# Patient Record
Sex: MALE | Race: BLACK OR AFRICAN AMERICAN | Employment: FULL TIME | URBAN - METROPOLITAN AREA
[De-identification: names, ages, dates, MRNs, and addresses within clinical notes are randomized per-mention and may not be internally consistent; named-entity substitution may affect disease eponyms.]

---

## 2021-09-14 ENCOUNTER — HOSPITAL ENCOUNTER (EMERGENCY)
Age: 36
Discharge: HOME | End: 2021-09-14
Payer: COMMERCIAL

## 2021-09-14 VITALS
HEIGHT: 72 IN | HEART RATE: 66 BPM | DIASTOLIC BLOOD PRESSURE: 92 MMHG | WEIGHT: 285 LBS | OXYGEN SATURATION: 99 % | SYSTOLIC BLOOD PRESSURE: 151 MMHG | TEMPERATURE: 98.5 F | RESPIRATION RATE: 18 BRPM | BODY MASS INDEX: 38.6 KG/M2

## 2021-09-14 DIAGNOSIS — H92.01 ACUTE PAIN OF RIGHT EAR: Primary | ICD-10-CM

## 2021-09-14 DIAGNOSIS — H66.90 ACUTE OTITIS MEDIA, UNSPECIFIED OTITIS MEDIA TYPE: ICD-10-CM

## 2021-09-14 DIAGNOSIS — R09.81 NASAL CONGESTION: ICD-10-CM

## 2021-09-14 DIAGNOSIS — H73.20 TYMPANIC MEMBRANE INFLAMMATION: ICD-10-CM

## 2021-09-14 PROCEDURE — 99282 EMERGENCY DEPT VISIT SF MDM: CPT

## 2021-09-14 RX ORDER — MINERAL OIL
180 ENEMA (ML) RECTAL DAILY
Qty: 30 TABLET | Refills: 0 | Status: SHIPPED | OUTPATIENT
Start: 2021-09-14

## 2021-09-14 RX ORDER — FLUTICASONE PROPIONATE 50 MCG
2 SPRAY, SUSPENSION (ML) NASAL DAILY
Qty: 16 G | Refills: 0 | Status: SHIPPED | OUTPATIENT
Start: 2021-09-14

## 2021-09-14 RX ORDER — AMOXICILLIN AND CLAVULANATE POTASSIUM 875; 125 MG/1; MG/1
1 TABLET, FILM COATED ORAL 2 TIMES DAILY
Qty: 10 TABLET | Refills: 0 | Status: SHIPPED | OUTPATIENT
Start: 2021-09-14 | End: 2021-09-19

## 2021-09-14 NOTE — ED PROVIDER NOTES
EMERGENCY DEPARTMENT HISTORY AND PHYSICAL EXAM      Date: 9/14/2021  Patient Name: Buzz Baires    History of Presenting Illness     Chief Complaint   Patient presents with    Ear Pain       History Provided By: Patient    HPI: Buzz Baires, 39 y.o. male with a past medical history significant No significant past medical history presents to the ED with cc of Right ear pain and fullness. He states that the symptoms began 2 to 3 days ago and accompanying symptoms of nasal congestion, postnasal drip, rhinorrhea. He denies fever, chills, body aches, recent illness, known sick contacts. There are no exacerbating or relieving factors. He has not tried any over-the-counter medications. There are no other complaints, changes, or physical findings at this time. PCP: None    No current facility-administered medications on file prior to encounter. No current outpatient medications on file prior to encounter. Past History     Past Medical History:  History reviewed. No pertinent past medical history. Past Surgical History:  History reviewed. No pertinent surgical history. Family History:  History reviewed. No pertinent family history. Social History:  Social History     Tobacco Use    Smoking status: Never Smoker    Smokeless tobacco: Never Used   Vaping Use    Vaping Use: Never used   Substance Use Topics    Alcohol use: Never    Drug use: Never       Allergies:  No Known Allergies      Review of Systems     Review of Systems   Constitutional: Negative for chills and fever. HENT: Positive for congestion, ear pain, postnasal drip and rhinorrhea. Negative for hearing loss, sinus pressure, sinus pain, sneezing, sore throat, tinnitus, trouble swallowing and voice change. Eyes: Negative. Respiratory: Negative for cough, chest tightness and shortness of breath. Cardiovascular: Negative for chest pain and palpitations.    Gastrointestinal: Negative for abdominal pain, blood in stool, constipation, diarrhea, nausea and vomiting. Endocrine: Negative. Genitourinary: Negative for dysuria, frequency and urgency. Musculoskeletal: Negative for back pain and myalgias. Skin: Negative for rash. Allergic/Immunologic: Negative. Neurological: Negative for dizziness, syncope, weakness and headaches. Hematological: Does not bruise/bleed easily. Psychiatric/Behavioral: Negative for confusion, sleep disturbance and suicidal ideas. The patient is not nervous/anxious. All other systems reviewed and are negative. Physical Exam     Physical Exam  Vitals and nursing note reviewed. Constitutional:       General: He is not in acute distress. Appearance: He is well-developed. He is not ill-appearing. HENT:      Head: Normocephalic and atraumatic. Jaw: There is normal jaw occlusion. Right Ear: Hearing normal. No decreased hearing noted. Swelling and tenderness present. No middle ear effusion. There is no impacted cerumen. No foreign body. No mastoid tenderness. No PE tube. No hemotympanum. Tympanic membrane is erythematous. Tympanic membrane is not scarred, perforated or bulging. Tympanic membrane has normal mobility. Left Ear: Hearing, tympanic membrane, ear canal and external ear normal.      Nose: Rhinorrhea present. No congestion. Mouth/Throat:      Pharynx: Oropharynx is clear. Eyes:      Extraocular Movements: Extraocular movements intact. Conjunctiva/sclera: Conjunctivae normal.      Pupils: Pupils are equal, round, and reactive to light. Neck:      Vascular: No JVD. Trachea: No tracheal deviation. Cardiovascular:      Rate and Rhythm: Normal rate and regular rhythm. No extrasystoles are present. Pulses:           Radial pulses are 2+ on the right side and 2+ on the left side. Heart sounds: Normal heart sounds. No murmur heard. Pulmonary:      Effort: Pulmonary effort is normal.      Breath sounds: Normal breath sounds. Chest:      Chest wall: No tenderness. Abdominal:      General: Bowel sounds are normal.      Palpations: Abdomen is soft. Tenderness: There is no abdominal tenderness. There is no guarding or rebound. Musculoskeletal:         General: Normal range of motion. Cervical back: Normal range of motion and neck supple. Right lower leg: No tenderness. No edema. Left lower leg: No tenderness. No edema. Skin:     General: Skin is warm and dry. Capillary Refill: Capillary refill takes less than 2 seconds. Findings: No erythema. Neurological:      General: No focal deficit present. Mental Status: He is alert and oriented to person, place, and time. Motor: No weakness. Psychiatric:         Mood and Affect: Mood normal.         Behavior: Behavior normal.         Lab and Diagnostic Study Results     Labs -   No results found for this or any previous visit (from the past 12 hour(s)). Radiologic Studies -   @lastxrresult@  CT Results  (Last 48 hours)    None        CXR Results  (Last 48 hours)    None            Medical Decision Making   - I am the first provider for this patient. - I reviewed the vital signs, available nursing notes, past medical history, past surgical history, family history and social history. - Initial assessment performed. The patients presenting problems have been discussed, and they are in agreement with the care plan formulated and outlined with them. I have encouraged them to ask questions as they arise throughout their visit. Vital Signs-Reviewed the patient's vital signs. No data found. Records Reviewed: Nursing Notes    The patient presents with ear pain with a differential diagnosis of otitis media, otitis externa, sinus infection, upper respiratory infection, impacted cerumen, postnasal drip, perforated eardrum      ED Course:   Patient was seen and evaluated and reviewed.   Discussed with attending physician who agreed with plan of care.  Patient remained stable with no change or deterioration of symptoms. Provider Notes (Medical Decision Making):     MDM  Number of Diagnoses or Management Options  Acute otitis media, unspecified otitis media type: new, no workup  Acute pain of right ear: new, no workup  Nasal congestion: new, no workup  Tympanic membrane inflammation: new, no workup     Amount and/or Complexity of Data Reviewed  Discuss the patient with other providers: yes    Risk of Complications, Morbidity, and/or Mortality  Presenting problems: minimal  Diagnostic procedures: minimal  Management options: minimal    Patient Progress  Patient progress: stable           Disposition   Disposition: Condition stable  DC- Adult Discharges: All of the diagnostic tests were reviewed and questions answered. Diagnosis, care plan and treatment options were discussed. The patient understands the instructions and will follow up as directed. The patients results have been reviewed with them. They have been counseled regarding their diagnosis. The patient verbally convey understanding and agreement of the signs, symptoms, diagnosis, treatment and prognosis and additionally agrees to follow up as recommended with their PCP in 24 - 48 hours. They also agree with the care-plan and convey that all of their questions have been answered. I have also put together some discharge instructions for them that include: 1) educational information regarding their diagnosis, 2) how to care for their diagnosis at home, as well a 3) list of reasons why they would want to return to the ED prior to their follow-up appointment, should their condition change. Discharged    DISCHARGE PLAN:  1. There are no discharge medications for this patient.     2.   Follow-up Information     Follow up With Specialties Details Why [address] [place] [address] [id number]  In 3 days PCP Referral [address] [place] [address]  [phone]    Follow up with primary care, urgent care, or this Emergency department   If symptoms worsen, As needed         3. Return to ED if worse   4. Discharge Medication List as of 9/14/2021  2:14 PM      START taking these medications    Details   amoxicillin-clavulanate (Augmentin) 875-125 mg per tablet Take 1 Tablet by mouth two (2) times a day for 5 days. , Normal, Disp-10 Tablet, R-0      fluticasone propionate (FLONASE) 50 mcg/actuation nasal spray 2 Sprays by Both Nostrils route daily. Indications: inflammation of the nose due to an allergy, Normal, Disp-16 g, R-0      fexofenadine (Allegra Allergy) 180 mg tablet Take 1 Tablet by mouth daily. Indications: inflammation of the nose due to an allergy, seasonal runny nose, Normal, Disp-30 Tablet, R-0               Diagnosis     Clinical Impression:   1. Acute pain of right ear    2. Tympanic membrane inflammation    3. Nasal congestion    4. Acute otitis media, unspecified otitis media type        Attestations:    Terrie Hoffmann NP    Please note that this dictation was completed with [employer], the [employer] voice recognition software. Quite often unanticipated grammatical, syntax, homophones, and other interpretive errors are inadvertently transcribed by the computer software. Please disregard these errors. Please excuse any errors that have escaped final proofreading. Thank you.

## 2022-12-10 PROBLEM — J30.9 ALLERGIC RHINITIS, UNSPECIFIED SEASONALITY, UNSPECIFIED TRIGGER: Status: ACTIVE | Noted: 2022-12-10

## 2022-12-10 PROBLEM — Z11.59 NEED FOR HEPATITIS C SCREENING TEST: Status: ACTIVE | Noted: 2022-12-10

## 2022-12-15 ENCOUNTER — OFFICE VISIT (OUTPATIENT)
Dept: INTERNAL MEDICINE CLINIC | Age: 37
End: 2022-12-15
Payer: COMMERCIAL

## 2022-12-15 VITALS
OXYGEN SATURATION: 99 % | RESPIRATION RATE: 16 BRPM | HEIGHT: 72 IN | HEART RATE: 80 BPM | TEMPERATURE: 97.8 F | SYSTOLIC BLOOD PRESSURE: 140 MMHG | DIASTOLIC BLOOD PRESSURE: 100 MMHG | WEIGHT: 291 LBS | BODY MASS INDEX: 39.42 KG/M2

## 2022-12-15 DIAGNOSIS — I10 PRIMARY HYPERTENSION: ICD-10-CM

## 2022-12-15 DIAGNOSIS — J30.9 ALLERGIC RHINITIS, UNSPECIFIED SEASONALITY, UNSPECIFIED TRIGGER: ICD-10-CM

## 2022-12-15 DIAGNOSIS — R53.83 FATIGUE, UNSPECIFIED TYPE: ICD-10-CM

## 2022-12-15 DIAGNOSIS — N39.9 URINARY PROBLEM IN MALE: ICD-10-CM

## 2022-12-15 DIAGNOSIS — Z00.00 ANNUAL PHYSICAL EXAM: ICD-10-CM

## 2022-12-15 DIAGNOSIS — E55.9 VITAMIN D DEFICIENCY: ICD-10-CM

## 2022-12-15 DIAGNOSIS — Z11.59 NEED FOR HEPATITIS C SCREENING TEST: ICD-10-CM

## 2022-12-15 DIAGNOSIS — R03.0 ELEVATED BP WITHOUT DIAGNOSIS OF HYPERTENSION: ICD-10-CM

## 2022-12-15 DIAGNOSIS — Z82.49 FAMILY HISTORY OF HYPERTENSION: ICD-10-CM

## 2022-12-15 RX ORDER — AMLODIPINE BESYLATE 5 MG/1
5 TABLET ORAL DAILY
Qty: 30 TABLET | Refills: 2 | Status: SHIPPED | OUTPATIENT
Start: 2022-12-15

## 2022-12-15 NOTE — PROGRESS NOTES
Chase Martel (: 1985) is a 40 y.o. male, new patient, here for evaluation of the following chief complaint(s):  Establish Care (NEW PATIENT)       ASSESSMENT/PLAN:  Below is the assessment and plan developed based on review of pertinent history, physical exam, labs, studies, and medications. 1. Primary hypertension  2. Fatigue, unspecified type  -     CBC WITH AUTOMATED DIFF  -     METABOLIC PANEL, COMPREHENSIVE  -     TSH RFX ON ABNORMAL TO FREE T4  3. Vitamin D deficiency  -     VITAMIN D, 25 HYDROXY  4. Urinary problem in male  -     URINALYSIS W/ RFLX MICROSCOPIC  5. Allergic rhinitis, unspecified seasonality, unspecified trigger  6. Elevated BP without diagnosis of hypertension  -     CBC WITH AUTOMATED DIFF  -     METABOLIC PANEL, COMPREHENSIVE  -     TSH RFX ON ABNORMAL TO FREE T4  7. Need for hepatitis C screening test  -     HEPATITIS C AB  8. Family history of hypertension  9. Annual physical exam  -     CBC WITH AUTOMATED DIFF  -     METABOLIC PANEL, COMPREHENSIVE    Mr. Remigio Solano is here to establish with me but he says he wants to do complete physical examination and he has multiple concerns so I will consider him as a new patient and I will not do the diagnostic for for annual physical which has been attached with the labs by technical error. Annual preventive physical examination is done in the office. His blood pressure is elevated. 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 After resting checked again twice in both upper arms manually with large adult size cuff. Discussed about various types of antihypertensive and various groups of medicine and decided to be on amlodipine 5 mg once a day and he will come in 3 weeks for blood pressure and pulse checkup. Labs ordered. He has family history of hypertension with his mother.     He feels fatigue and tired so labs ordered including thyroid function and his BMI is 39.47 explained to start physical activity minimum to begin with 15 minutes a day gradually increasing to 30 minutes a day and if possible 45 minutes a day he is a  so I told him that he can start gradually. He drinks heavily with liquor on weekends especially Sunday with his friends recommended to stop for now having hypertension and fatigue. He says that whenever he is extremely tired then only he snores otherwise there is no snoring or cessation of breathing so right now I will not send him for sleep apnea evaluation. He says he had vitamin D deficiency in the past so labs ordered. He says sometimes he urinates but no erectile dysfunction no burning pain in urination no family or personal history of prostate related complaints no dysuria no straining while urination sometimes he leaks urine. No family history of prostate cancer. He is sexually active with his girlfriend. His girlfriend has history of seizure disorder. Patient has no anxiety depression or mental health problems so urine orders for the lab. Strongly recommended to have 1500-calorie diet plan and diet low in sodium, low in sugar, low in starch, low in saturated fat and to read the food labels and eat more greens and dark greens vegetables and lentils known sugary fruits and fibers in the diet drink more water    Follow-up in 3 months with me in in 3 weeks with physician in our office. Labs ordered. Educational brochures given. Immunizations reviewed discussed with him he says he never takes flu vaccine he does not want to take vaccine for now    Return in about 3 weeks (around 1/5/2023) for follow up for chronic conditionON NEW BP MEDS,LABS ORDERED. SUBJECTIVE/OBJECTIVE:  HPI  Trever Reasons he came for annual preventive physical.  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 No recent history of ER visit. No history of mental health problems or depression. He is pleasant personality. No history of substance abuse. No history of smoking cigarette but he does acknowledges that he drinks more than usual on weekend especially on Sunday with liquors and little bit sharing with his friend. He agreed to cut back and stop. He says he feels tired and fatigued. No blurry vision or no chest pain shortness of breath or headache or dizziness. He is sexually active and he has his girlfriend who has seizure disorder. He says his mother is going to see me and he lives closer to his mother. His mother also has history of hypertension but no history of colon cancer or prostate cancer in the family and no history of heart attack or stroke in the family. He agreed and will try to lose weight. He agreed to do blood work. Sometimes he says he urinates. No erectile dysfunction. No Known Allergies  Current Outpatient Medications   Medication Sig    amLODIPine (NORVASC) 5 mg tablet Take 1 Tablet by mouth daily. fluticasone propionate (FLONASE) 50 mcg/actuation nasal spray 2 Sprays by Both Nostrils route daily. Indications: inflammation of the nose due to an allergy (Patient not taking: Reported on 12/15/2022)    fexofenadine (Allegra Allergy) 180 mg tablet Take 1 Tablet by mouth daily. Indications: inflammation of the nose due to an allergy, seasonal runny nose (Patient not taking: Reported on 12/15/2022)     No current facility-administered medications for this visit. History reviewed. No pertinent past medical history. Past Surgical History:   Procedure Laterality Date    HX ORTHOPAEDIC Left     left shoulder     Family History   Problem Relation Age of Onset    No Known Problems Mother     Hypertension Father      Social History     Tobacco Use   Smoking Status Never   Smokeless Tobacco Never         Review of Systems   Constitutional:  Positive for malaise/fatigue. Negative for chills, diaphoresis, fever and weight loss.    HENT:  Negative for congestion, hearing loss, sore throat and tinnitus. Eyes:  Negative for blurred vision. Respiratory:  Negative for cough, shortness of breath and wheezing. Cardiovascular: Negative. Gastrointestinal: Negative. Genitourinary: Negative. Musculoskeletal: Negative. Skin: Negative. Neurological:  Negative for dizziness, tingling, tremors and headaches. Psychiatric/Behavioral: Negative. Vitals:    12/15/22 1418 12/15/22 1425 12/15/22 1439   BP: (!) 158/94 (!) 154/98 (!) 140/100   Pulse: 78  80   Resp: 16     Temp: 97.8 °F (36.6 °C)     TempSrc: Temporal     SpO2: 99%     Weight: 291 lb (132 kg)     Height: 6' (1.829 m)            Physical Exam  Constitutional:       General: He is not in acute distress. Appearance: He is obese. He is not ill-appearing, toxic-appearing or diaphoretic. HENT:      Mouth/Throat:      Mouth: Mucous membranes are moist.   Eyes:      Pupils: Pupils are equal, round, and reactive to light. Cardiovascular:      Rate and Rhythm: Normal rate and regular rhythm. Heart sounds: No murmur heard. Pulmonary:      Effort: Pulmonary effort is normal.      Breath sounds: Normal breath sounds. No wheezing or rhonchi. Abdominal:      General: Bowel sounds are normal. There is no distension. Palpations: Abdomen is soft. Tenderness: There is no abdominal tenderness. Musculoskeletal:         General: No swelling or deformity. Cervical back: Neck supple. Lymphadenopathy:      Cervical: No cervical adenopathy. Neurological:      General: No focal deficit present. Mental Status: He is alert and oriented to person, place, and time. Cranial Nerves: No cranial nerve deficit. Motor: No weakness.    Psychiatric:         Mood and Affect: Mood normal.         Behavior: Behavior normal.       On this date 12/15/2022 I have spent 45 minutes reviewing previous notes, test results and face to face with the patient discussing the diagnosis and importance of compliance with the treatment plan as well as documenting on the day of the visit. An electronic signature was used to authenticate this note.   -- Bhavana Spicer MD

## 2022-12-15 NOTE — PROGRESS NOTES
Chief Complaint   Patient presents with    [facility]     NEW PATIENT           Visit Vitals  BP (!) 154/98 (BP 1 Location: Right arm, BP Patient Position: Sitting)   Pulse 78   Temp 97.8 °F (36.6 °C) (Temporal)   Resp 16   Ht 6' (1.829 m)   Wt 291 lb (132 kg)   SpO2 99%   BMI 39.47 kg/m²           1. \"Have you been to the ER, urgent care clinic since your last visit? Hospitalized since your last visit? \" No    2. \"Have you seen or consulted any other health care providers outside of the [address] [place] [address] since your last visit? \" No     3. For patients aged 39-70: Has the patient had a colonoscopy / FIT/ Cologuard? NA - based on age      If the patient is female:    4. For patients aged 41-77: Has the patient had a mammogram within the past 2 years? NA - based on age or sex      11. For patients aged 21-65: Has the patient had a pap smear?  NA - based on age or sex

## 2022-12-16 LAB
25(OH)D3+25(OH)D2 SERPL-MCNC: 14.6 NG/ML (ref 30–100)
ALBUMIN SERPL-MCNC: 4.3 G/DL (ref 4–5)
ALBUMIN/GLOB SERPL: 1.5 {RATIO} (ref 1.2–2.2)
ALP SERPL-CCNC: 54 IU/L (ref 44–121)
ALT SERPL-CCNC: 13 IU/L (ref 0–44)
APPEARANCE UR: CLEAR
AST SERPL-CCNC: 13 IU/L (ref 0–40)
BASOPHILS # BLD AUTO: 0 X10E3/UL (ref 0–0.2)
BASOPHILS NFR BLD AUTO: 1 %
BILIRUB SERPL-MCNC: 0.4 MG/DL (ref 0–1.2)
BILIRUB UR QL STRIP: NEGATIVE
BUN SERPL-MCNC: 12 MG/DL (ref 6–20)
BUN/CREAT SERPL: 15 (ref 9–20)
CALCIUM SERPL-MCNC: 9.5 MG/DL (ref 8.7–10.2)
CHLORIDE SERPL-SCNC: 103 MMOL/L (ref 96–106)
CO2 SERPL-SCNC: 26 MMOL/L (ref 20–29)
COLOR UR: YELLOW
CREAT SERPL-MCNC: 0.82 MG/DL (ref 0.76–1.27)
EGFR: 116 ML/MIN/1.73
EOSINOPHIL # BLD AUTO: 0 X10E3/UL (ref 0–0.4)
EOSINOPHIL NFR BLD AUTO: 1 %
ERYTHROCYTE [DISTWIDTH] IN BLOOD BY AUTOMATED COUNT: 14.6 % (ref 11.6–15.4)
GLOBULIN SER CALC-MCNC: 2.8 G/DL (ref 1.5–4.5)
GLUCOSE SERPL-MCNC: 90 MG/DL (ref 70–99)
GLUCOSE UR QL STRIP: NEGATIVE
HCT VFR BLD AUTO: 40.8 % (ref 37.5–51)
HCV AB S/CO SERPL IA: 0.1 S/CO RATIO (ref 0–0.9)
HGB BLD-MCNC: 13 G/DL (ref 13–17.7)
HGB UR QL STRIP: NEGATIVE
IMM GRANULOCYTES # BLD AUTO: 0 X10E3/UL (ref 0–0.1)
IMM GRANULOCYTES NFR BLD AUTO: 0 %
KETONES UR QL STRIP: NEGATIVE
LEUKOCYTE ESTERASE UR QL STRIP: NEGATIVE
LYMPHOCYTES # BLD AUTO: 1.5 X10E3/UL (ref 0.7–3.1)
LYMPHOCYTES NFR BLD AUTO: 36 %
MCH RBC QN AUTO: 25.2 PG (ref 26.6–33)
MCHC RBC AUTO-ENTMCNC: 31.9 G/DL (ref 31.5–35.7)
MCV RBC AUTO: 79 FL (ref 79–97)
MICRO URNS: NORMAL
MONOCYTES # BLD AUTO: 0.3 X10E3/UL (ref 0.1–0.9)
MONOCYTES NFR BLD AUTO: 7 %
NEUTROPHILS # BLD AUTO: 2.3 X10E3/UL (ref 1.4–7)
NEUTROPHILS NFR BLD AUTO: 55 %
NITRITE UR QL STRIP: NEGATIVE
PH UR STRIP: 6.5 [PH] (ref 5–7.5)
PLATELET # BLD AUTO: 308 X10E3/UL (ref 150–450)
POTASSIUM SERPL-SCNC: 4.4 MMOL/L (ref 3.5–5.2)
PROT SERPL-MCNC: 7.1 G/DL (ref 6–8.5)
PROT UR QL STRIP: NEGATIVE
RBC # BLD AUTO: 5.15 X10E6/UL (ref 4.14–5.8)
SODIUM SERPL-SCNC: 142 MMOL/L (ref 134–144)
SP GR UR STRIP: 1.02 (ref 1–1.03)
TSH SERPL DL<=0.005 MIU/L-ACNC: 1.48 UIU/ML (ref 0.45–4.5)
UROBILINOGEN UR STRIP-MCNC: 1 MG/DL (ref 0.2–1)
WBC # BLD AUTO: 4.2 X10E3/UL (ref 3.4–10.8)

## 2022-12-16 RX ORDER — ERGOCALCIFEROL 1.25 MG/1
50000 CAPSULE ORAL
Qty: 12 CAPSULE | Refills: 0 | Status: SHIPPED | OUTPATIENT
Start: 2022-12-16

## 2022-12-16 NOTE — PROGRESS NOTES
Yesterday Mr. Remigio Solano came for as a new patient establishment    Urine is normal and there is nothing abnormality detected in urine and awaiting further lab results for blood work

## 2022-12-16 NOTE — PROGRESS NOTES
Please inform Mr. Fernandez Oneida    Complete blood count including white cell count hemoglobin platelets normal    CMP including fasting blood sugar kidney function electrolytes including potassium and liver enzymes normal    Thyroid function normal    Screening for hepatitis C virus negative    Vitamin D is low I am sending vitamin D once a week for 3 months to the pharmacy and after that we will check again when he comes for office visit.

## 2022-12-19 ENCOUNTER — TELEPHONE (OUTPATIENT)
Dept: INTERNAL MEDICINE CLINIC | Age: 37
End: 2022-12-19

## 2022-12-19 NOTE — TELEPHONE ENCOUNTER
Called and informed pt of lab results. Pt expressed understanding  ----- Message from Stewart Brizuela MD sent at 12/16/2022  4:27 PM EST -----  Please inform Mr. Rishabh Ty    Complete blood count including white cell count hemoglobin platelets normal    CMP including fasting blood sugar kidney function electrolytes including potassium and liver enzymes normal    Thyroid function normal    Screening for hepatitis C virus negative    Vitamin D is low I am sending vitamin D once a week for 3 months to the pharmacy and after that we will check again when he comes for office visit.

## 2023-01-09 PROBLEM — Z11.59 NEED FOR HEPATITIS C SCREENING TEST: Status: RESOLVED | Noted: 2022-12-10 | Resolved: 2023-01-09

## 2023-01-14 PROBLEM — Z00.00 ANNUAL PHYSICAL EXAM: Status: RESOLVED | Noted: 2022-12-15 | Resolved: 2023-01-14

## 2023-08-20 PROBLEM — R03.0 ELEVATED BP WITHOUT DIAGNOSIS OF HYPERTENSION: Status: RESOLVED | Noted: 2022-12-15 | Resolved: 2023-08-20

## 2023-08-22 ENCOUNTER — OFFICE VISIT (OUTPATIENT)
Facility: CLINIC | Age: 38
End: 2023-08-22
Payer: COMMERCIAL

## 2023-08-22 VITALS
DIASTOLIC BLOOD PRESSURE: 100 MMHG | HEART RATE: 60 BPM | TEMPERATURE: 98.2 F | WEIGHT: 304.2 LBS | SYSTOLIC BLOOD PRESSURE: 152 MMHG | HEIGHT: 72 IN | OXYGEN SATURATION: 97 % | BODY MASS INDEX: 41.2 KG/M2

## 2023-08-22 DIAGNOSIS — Z11.4 ENCOUNTER FOR SCREENING FOR HIV: ICD-10-CM

## 2023-08-22 DIAGNOSIS — I10 PRIMARY HYPERTENSION: ICD-10-CM

## 2023-08-22 DIAGNOSIS — E55.9 VITAMIN D DEFICIENCY: ICD-10-CM

## 2023-08-22 DIAGNOSIS — R06.83 SNORING: ICD-10-CM

## 2023-08-22 DIAGNOSIS — I10 PRIMARY HYPERTENSION: Primary | ICD-10-CM

## 2023-08-22 PROCEDURE — 3077F SYST BP >= 140 MM HG: CPT | Performed by: INTERNAL MEDICINE

## 2023-08-22 PROCEDURE — 3080F DIAST BP >= 90 MM HG: CPT | Performed by: INTERNAL MEDICINE

## 2023-08-22 PROCEDURE — 99214 OFFICE O/P EST MOD 30 MIN: CPT | Performed by: INTERNAL MEDICINE

## 2023-08-22 RX ORDER — LOSARTAN POTASSIUM AND HYDROCHLOROTHIAZIDE 12.5; 5 MG/1; MG/1
1 TABLET ORAL DAILY
Qty: 90 TABLET | Refills: 1 | Status: SHIPPED | OUTPATIENT
Start: 2023-08-22

## 2023-08-22 RX ORDER — ERGOCALCIFEROL 1.25 MG/1
50000 CAPSULE ORAL
Qty: 12 CAPSULE | Refills: 0 | Status: SHIPPED | OUTPATIENT
Start: 2023-08-22

## 2023-08-22 SDOH — ECONOMIC STABILITY: HOUSING INSECURITY
IN THE LAST 12 MONTHS, WAS THERE A TIME WHEN YOU DID NOT HAVE A STEADY PLACE TO SLEEP OR SLEPT IN A SHELTER (INCLUDING NOW)?: NO

## 2023-08-22 SDOH — ECONOMIC STABILITY: FOOD INSECURITY: WITHIN THE PAST 12 MONTHS, THE FOOD YOU BOUGHT JUST DIDN'T LAST AND YOU DIDN'T HAVE MONEY TO GET MORE.: NEVER TRUE

## 2023-08-22 SDOH — ECONOMIC STABILITY: INCOME INSECURITY: HOW HARD IS IT FOR YOU TO PAY FOR THE VERY BASICS LIKE FOOD, HOUSING, MEDICAL CARE, AND HEATING?: NOT HARD AT ALL

## 2023-08-22 SDOH — ECONOMIC STABILITY: FOOD INSECURITY: WITHIN THE PAST 12 MONTHS, YOU WORRIED THAT YOUR FOOD WOULD RUN OUT BEFORE YOU GOT MONEY TO BUY MORE.: NEVER TRUE

## 2023-08-22 ASSESSMENT — PATIENT HEALTH QUESTIONNAIRE - PHQ9
SUM OF ALL RESPONSES TO PHQ QUESTIONS 1-9: 0
SUM OF ALL RESPONSES TO PHQ QUESTIONS 1-9: 0
1. LITTLE INTEREST OR PLEASURE IN DOING THINGS: 0
SUM OF ALL RESPONSES TO PHQ9 QUESTIONS 1 & 2: 0
SUM OF ALL RESPONSES TO PHQ QUESTIONS 1-9: 0
2. FEELING DOWN, DEPRESSED OR HOPELESS: 0
SUM OF ALL RESPONSES TO PHQ QUESTIONS 1-9: 0

## 2023-08-22 ASSESSMENT — ENCOUNTER SYMPTOMS
RESPIRATORY NEGATIVE: 1
EYES NEGATIVE: 1
GASTROINTESTINAL NEGATIVE: 1
ALLERGIC/IMMUNOLOGIC NEGATIVE: 1

## 2023-08-23 LAB
25(OH)D3+25(OH)D2 SERPL-MCNC: 14.6 NG/ML (ref 30–100)
ALBUMIN SERPL-MCNC: 4.4 G/DL (ref 4.1–5.1)
ALBUMIN/GLOB SERPL: 1.6 {RATIO} (ref 1.2–2.2)
ALP SERPL-CCNC: 56 IU/L (ref 44–121)
ALT SERPL-CCNC: 23 IU/L (ref 0–44)
AST SERPL-CCNC: 18 IU/L (ref 0–40)
BASOPHILS # BLD AUTO: 0 X10E3/UL (ref 0–0.2)
BASOPHILS NFR BLD AUTO: 1 %
BILIRUB SERPL-MCNC: 0.4 MG/DL (ref 0–1.2)
BUN SERPL-MCNC: 9 MG/DL (ref 6–20)
BUN/CREAT SERPL: 11 (ref 9–20)
CALCIUM SERPL-MCNC: 9.2 MG/DL (ref 8.7–10.2)
CHLORIDE SERPL-SCNC: 102 MMOL/L (ref 96–106)
CO2 SERPL-SCNC: 24 MMOL/L (ref 20–29)
CREAT SERPL-MCNC: 0.84 MG/DL (ref 0.76–1.27)
EGFRCR SERPLBLD CKD-EPI 2021: 114 ML/MIN/1.73
EOSINOPHIL # BLD AUTO: 0.1 X10E3/UL (ref 0–0.4)
EOSINOPHIL NFR BLD AUTO: 1 %
ERYTHROCYTE [DISTWIDTH] IN BLOOD BY AUTOMATED COUNT: 14.9 % (ref 11.6–15.4)
GLOBULIN SER CALC-MCNC: 2.8 G/DL (ref 1.5–4.5)
GLUCOSE SERPL-MCNC: 109 MG/DL (ref 70–99)
HCT VFR BLD AUTO: 40.4 % (ref 37.5–51)
HGB BLD-MCNC: 13 G/DL (ref 13–17.7)
HIV 1+2 AB+HIV1 P24 AG SERPL QL IA: NON REACTIVE
IMM GRANULOCYTES # BLD AUTO: 0 X10E3/UL (ref 0–0.1)
IMM GRANULOCYTES NFR BLD AUTO: 0 %
LYMPHOCYTES # BLD AUTO: 1.7 X10E3/UL (ref 0.7–3.1)
LYMPHOCYTES NFR BLD AUTO: 35 %
MCH RBC QN AUTO: 26.3 PG (ref 26.6–33)
MCHC RBC AUTO-ENTMCNC: 32.2 G/DL (ref 31.5–35.7)
MCV RBC AUTO: 82 FL (ref 79–97)
MONOCYTES # BLD AUTO: 0.3 X10E3/UL (ref 0.1–0.9)
MONOCYTES NFR BLD AUTO: 6 %
NEUTROPHILS # BLD AUTO: 2.8 X10E3/UL (ref 1.4–7)
NEUTROPHILS NFR BLD AUTO: 57 %
PLATELET # BLD AUTO: 271 X10E3/UL (ref 150–450)
POTASSIUM SERPL-SCNC: 4.2 MMOL/L (ref 3.5–5.2)
PROT SERPL-MCNC: 7.2 G/DL (ref 6–8.5)
RBC # BLD AUTO: 4.94 X10E6/UL (ref 4.14–5.8)
SODIUM SERPL-SCNC: 143 MMOL/L (ref 134–144)
TSH SERPL DL<=0.005 MIU/L-ACNC: 1.66 UIU/ML (ref 0.45–4.5)
WBC # BLD AUTO: 5 X10E3/UL (ref 3.4–10.8)

## 2023-08-23 RX ORDER — ERGOCALCIFEROL 1.25 MG/1
50000 CAPSULE ORAL WEEKLY
Qty: 12 CAPSULE | Refills: 1 | Status: SHIPPED | OUTPATIENT
Start: 2023-08-23

## 2023-08-29 ENCOUNTER — TELEPHONE (OUTPATIENT)
Facility: CLINIC | Age: 38
End: 2023-08-29

## 2023-08-29 NOTE — TELEPHONE ENCOUNTER
Patient called stating he spoke to his insurance and they will pay for them. He needs pre-authorizations for [facility]RUBIO BROUSSARD and Saxenda, but the phentermine is fine to send to the [facility] [address].

## 2023-09-26 ENCOUNTER — TELEPHONE (OUTPATIENT)
Facility: CLINIC | Age: 38
End: 2023-09-26

## 2023-09-26 ENCOUNTER — OFFICE VISIT (OUTPATIENT)
Facility: CLINIC | Age: 38
End: 2023-09-26
Payer: COMMERCIAL

## 2023-09-26 VITALS
BODY MASS INDEX: 40.8 KG/M2 | DIASTOLIC BLOOD PRESSURE: 100 MMHG | SYSTOLIC BLOOD PRESSURE: 142 MMHG | HEART RATE: 60 BPM | WEIGHT: 300.8 LBS | TEMPERATURE: 97.9 F | OXYGEN SATURATION: 97 %

## 2023-09-26 DIAGNOSIS — I10 PRIMARY HYPERTENSION: Primary | ICD-10-CM

## 2023-09-26 DIAGNOSIS — R73.9 HYPERGLYCEMIA: ICD-10-CM

## 2023-09-26 DIAGNOSIS — E11.65 TYPE 2 DIABETES MELLITUS WITH HYPERGLYCEMIA, WITHOUT LONG-TERM CURRENT USE OF INSULIN (HCC): ICD-10-CM

## 2023-09-26 DIAGNOSIS — H54.3 IMPAIRED VISION IN BOTH EYES: ICD-10-CM

## 2023-09-26 DIAGNOSIS — Z13.1 SCREENING FOR DIABETES MELLITUS (DM): ICD-10-CM

## 2023-09-26 DIAGNOSIS — E55.9 VITAMIN D DEFICIENCY: ICD-10-CM

## 2023-09-26 LAB
GLUCOSE, POC: 130 MG/DL
HBA1C MFR BLD: 7.2 %

## 2023-09-26 PROCEDURE — 82962 GLUCOSE BLOOD TEST: CPT | Performed by: INTERNAL MEDICINE

## 2023-09-26 PROCEDURE — 3075F SYST BP GE 130 - 139MM HG: CPT | Performed by: INTERNAL MEDICINE

## 2023-09-26 PROCEDURE — 3080F DIAST BP >= 90 MM HG: CPT | Performed by: INTERNAL MEDICINE

## 2023-09-26 PROCEDURE — 83036 HEMOGLOBIN GLYCOSYLATED A1C: CPT | Performed by: INTERNAL MEDICINE

## 2023-09-26 PROCEDURE — 99214 OFFICE O/P EST MOD 30 MIN: CPT | Performed by: INTERNAL MEDICINE

## 2023-09-26 RX ORDER — METFORMIN HYDROCHLORIDE 500 MG/1
TABLET, EXTENDED RELEASE ORAL
Qty: 90 TABLET | Refills: 1 | Status: SHIPPED | OUTPATIENT
Start: 2023-09-26

## 2023-09-26 RX ORDER — BLOOD SUGAR DIAGNOSTIC
STRIP MISCELLANEOUS
Qty: 100 EACH | Refills: 3 | Status: SHIPPED | OUTPATIENT
Start: 2023-09-26

## 2023-09-26 RX ORDER — ERGOCALCIFEROL 1.25 MG/1
50000 CAPSULE ORAL WEEKLY
Qty: 12 CAPSULE | Refills: 1 | Status: SHIPPED | OUTPATIENT
Start: 2023-09-26

## 2023-09-26 RX ORDER — GLUCOSAMINE HCL/CHONDROITIN SU 500-400 MG
CAPSULE ORAL
Qty: 100 STRIP | Refills: 3 | Status: SHIPPED | OUTPATIENT
Start: 2023-09-26

## 2023-09-26 RX ORDER — SEMAGLUTIDE 0.5 MG/.5ML
0.5 INJECTION, SOLUTION SUBCUTANEOUS
Qty: 2 ML | Refills: 0 | Status: SHIPPED | OUTPATIENT
Start: 2023-09-26

## 2023-09-26 RX ORDER — LANCETS 30 GAUGE
EACH MISCELLANEOUS
Qty: 100 EACH | Refills: 3 | Status: SHIPPED | OUTPATIENT
Start: 2023-09-26

## 2023-09-26 RX ORDER — DIPHENHYDRAMINE HYDROCHLORIDE 25 MG/1
CAPSULE, LIQUID FILLED ORAL
Qty: 1 KIT | Refills: 1 | Status: SHIPPED | OUTPATIENT
Start: 2023-09-26

## 2023-09-26 ASSESSMENT — ENCOUNTER SYMPTOMS
EYES NEGATIVE: 1
ALLERGIC/IMMUNOLOGIC NEGATIVE: 1
GASTROINTESTINAL NEGATIVE: 1

## 2023-09-26 NOTE — PROGRESS NOTES
Kevin King (: 1985) is a 45 y.o. male, Established patient patient, here for evaluation of the following chief complaint(s):  Follow-up Chronic Condition and Hypertension         ASSESSMENT/PLAN:  Below is the assessment and plan developed based on review of pertinent history, physical exam, labs, studies, and medications. 1. Primary hypertension  -     External Referral To Optometry  -     EKG 12 Lead; Future  2. Type 2 diabetes mellitus with hyperglycemia, without long-term current use of insulin ([facility])  3. Vitamin D deficiency  4. BMI 40.0-44.9, adult ([facility])  -     EKG 12 Lead; Future  5. Impaired vision in both eyes  -     External Referral To Optometry  6. Screening for diabetes mellitus (DM)  -     AMB POC HEMOGLOBIN A1C  -     AMB POC GLUCOSE BLOOD, BY GLUCOSE MONITORING DEVICE  7. Hyperglycemia  8. Type 2 diabetes mellitus with hyperglycemia ([facility])  -     [facility]    Hypertension uncontrolled because he forgets to take antihypertensive that I have prescribed and he has not taken for last 4 days discussed about serious complications from uncontrolled blood pressure leading to heart disease stroke kidney complication and other complications    EKG ordered. New onset type 2 diabetes mellitus. Fasting blood sugar is 130 in the office hemoglobin A1c is 7.2% in the office. He does not have classical symptoms of polyuria or polydipsia of course he does drink a lot of water he wants to lose weight and he called his insurance company and he says V-Go is covered started on metformin side effect discussed he initially will take metformin 500 mg ER once a day for 4 weeks and then he will start twice a day with lunch and dinner and V-Go we also started and he has no personal or family history of pancreatic cancer or thyroid cancer or medullary endocrine neoplasia type II.   He has been ring of vision so I referred him to ophthalmologist.    I am sending diabetic

## 2023-09-26 NOTE — TELEPHONE ENCOUNTER
Patient called and stated that Julio stated his prescription for [facility]RUBIO BROUSSARD is on back order for 6 months. He would like something else prescribed if possible.

## 2023-10-26 PROBLEM — Z13.1 SCREENING FOR DIABETES MELLITUS (DM): Status: RESOLVED | Noted: 2022-12-15 | Resolved: 2023-10-26

## 2023-10-27 ENCOUNTER — TELEPHONE (OUTPATIENT)
Facility: CLINIC | Age: 38
End: 2023-10-27

## 2023-10-27 RX ORDER — PHENTERMINE HYDROCHLORIDE 37.5 MG/1
37.5 TABLET ORAL
Qty: 30 TABLET | Refills: 1 | Status: SHIPPED | OUTPATIENT
Start: 2023-10-27 | End: 2023-12-26

## 2023-10-27 NOTE — TELEPHONE ENCOUNTER
BP readings today were 137/91, 124/89, and 128/86. Please advise. Per patient you were going to send in alternate weight loss medication.

## 2023-11-30 RX ORDER — SEMAGLUTIDE 0.5 MG/.5ML
0.5 INJECTION, SOLUTION SUBCUTANEOUS
Qty: 2 ML | Refills: 0 | Status: SHIPPED | OUTPATIENT
Start: 2023-11-30

## 2023-11-30 NOTE — TELEPHONE ENCOUNTER
----- Message from Mike Delacruz sent at 11/30/2023  2:27 PM EST -----  Subject: Refill Request    QUESTIONS  Name of Medication? Semaglutide-Weight Management (WEGOVY) 0.5 MG/0.5ML   SOAJ SC injection  Patient-reported dosage and instructions? As directed  How many days do you have left? 0  Preferred Pharmacy? [address] [facility] #[id number]  Pharmacy phone number (if available)? [phone]  ---------------------------------------------------------------------------  --------------  Yoselin Fleming INFO  What is the best way for the office to contact you? OK to leave message on   voicemail  Preferred Call Back Phone Number? [phone]  ---------------------------------------------------------------------------  --------------  SCRIPT ANSWERS  Relationship to Patient?  Self

## 2023-12-14 NOTE — TELEPHONE ENCOUNTER
----- Message from [facility] sent at 12/11/2023  1:54 PM EST -----  Subject: Refill Request    QUESTIONS  Name of Medication? phentermine (ADIPEX-P) 37.5 MG tablet  Patient-reported dosage and instructions? 1 in the am   How many days do you have left? 0  Preferred Pharmacy? [facility] #[id number]  Pharmacy phone number (if available)? [phone]  Additional Information for Provider? Pt can not get the wegovy due to drug   shortage. Please call pt if need be.   ---------------------------------------------------------------------------  --------------  CALL BACK INFO  What is the best way for the office to contact you? OK to leave message on   voicemail  Preferred Call Back Phone Number? [phone]  ---------------------------------------------------------------------------  --------------  SCRIPT ANSWERS  Relationship to Patient?  Self

## 2023-12-15 RX ORDER — PHENTERMINE HYDROCHLORIDE 37.5 MG/1
37.5 TABLET ORAL
Qty: 30 TABLET | Refills: 1 | Status: SHIPPED | OUTPATIENT
Start: 2023-12-15 | End: 2024-02-13

## 2023-12-18 PROBLEM — Z13.220 SCREENING FOR LIPID DISORDERS: Status: ACTIVE | Noted: 2023-12-18

## 2023-12-19 DIAGNOSIS — I10 PRIMARY HYPERTENSION: ICD-10-CM

## 2023-12-19 DIAGNOSIS — Z13.220 SCREENING FOR LIPID DISORDERS: ICD-10-CM

## 2023-12-19 DIAGNOSIS — E55.9 VITAMIN D DEFICIENCY: ICD-10-CM

## 2023-12-19 DIAGNOSIS — E11.65 TYPE 2 DIABETES MELLITUS WITH HYPERGLYCEMIA, WITHOUT LONG-TERM CURRENT USE OF INSULIN (HCC): ICD-10-CM

## 2023-12-21 LAB
25(OH)D3+25(OH)D2 SERPL-MCNC: 32.3 NG/ML (ref 30–100)
ALBUMIN SERPL-MCNC: 4.4 G/DL (ref 4.1–5.1)
ALBUMIN/GLOB SERPL: 1.8 {RATIO} (ref 1.2–2.2)
ALP SERPL-CCNC: 51 IU/L (ref 44–121)
ALT SERPL-CCNC: 11 IU/L (ref 0–44)
AST SERPL-CCNC: 12 IU/L (ref 0–40)
BILIRUB SERPL-MCNC: 0.4 MG/DL (ref 0–1.2)
BUN SERPL-MCNC: 14 MG/DL (ref 6–20)
BUN/CREAT SERPL: 16 (ref 9–20)
CALCIUM SERPL-MCNC: 9.1 MG/DL (ref 8.7–10.2)
CHLORIDE SERPL-SCNC: 102 MMOL/L (ref 96–106)
CHOLEST SERPL-MCNC: 206 MG/DL (ref 100–199)
CO2 SERPL-SCNC: 25 MMOL/L (ref 20–29)
CREAT SERPL-MCNC: 0.89 MG/DL (ref 0.76–1.27)
EGFRCR SERPLBLD CKD-EPI 2021: 112 ML/MIN/1.73
GLOBULIN SER CALC-MCNC: 2.5 G/DL (ref 1.5–4.5)
GLUCOSE SERPL-MCNC: 102 MG/DL (ref 70–99)
HBA1C MFR BLD: 6.4 % (ref 4.8–5.6)
HDLC SERPL-MCNC: 36 MG/DL
LDLC SERPL CALC-MCNC: 155 MG/DL (ref 0–99)
POTASSIUM SERPL-SCNC: 4.5 MMOL/L (ref 3.5–5.2)
PROT SERPL-MCNC: 6.9 G/DL (ref 6–8.5)
SODIUM SERPL-SCNC: 141 MMOL/L (ref 134–144)
TRIGL SERPL-MCNC: 84 MG/DL (ref 0–149)
VLDLC SERPL CALC-MCNC: 15 MG/DL (ref 5–40)

## 2024-01-17 PROBLEM — Z13.220 SCREENING FOR LIPID DISORDERS: Status: RESOLVED | Noted: 2023-12-18 | Resolved: 2024-01-17

## 2024-02-01 ENCOUNTER — TELEPHONE (OUTPATIENT)
Facility: CLINIC | Age: 39
End: 2024-02-01

## 2024-02-01 NOTE — TELEPHONE ENCOUNTER
Patient is requesting a refill on phentermine 37.5 mg Qty 30 with refills to [facility]

## 2024-02-08 NOTE — TELEPHONE ENCOUNTER
Patient is asking for refill on Phentermine. He did not come in for BP check after 30 days.  I called and spoke to him and reminded him he needed nurse visit for BP check. He will come in this week.   I did no pend a refill request to you. I will send it when he comes in.   Is that ok?

## 2024-02-09 RX ORDER — PHENTERMINE HYDROCHLORIDE 37.5 MG/1
37.5 TABLET ORAL
Qty: 30 TABLET | Refills: 0 | Status: SHIPPED | OUTPATIENT
Start: 2024-02-09 | End: 2024-04-09

## 2024-04-07 PROBLEM — R73.9 HYPERGLYCEMIA: Status: RESOLVED | Noted: 2023-09-26 | Resolved: 2024-04-07

## 2024-06-05 ENCOUNTER — TELEPHONE (OUTPATIENT)
Facility: CLINIC | Age: 39
End: 2024-06-05

## 2024-06-05 RX ORDER — LOSARTAN POTASSIUM AND HYDROCHLOROTHIAZIDE 12.5; 5 MG/1; MG/1
1 TABLET ORAL EVERY MORNING
Qty: 90 TABLET | Refills: 0 | Status: SHIPPED | OUTPATIENT
Start: 2024-06-05

## 2024-06-05 RX ORDER — ERGOCALCIFEROL 1.25 MG/1
50000 CAPSULE ORAL WEEKLY
Qty: 12 CAPSULE | Refills: 0 | Status: SHIPPED | OUTPATIENT
Start: 2024-06-05

## 2024-06-05 NOTE — TELEPHONE ENCOUNTER
Patient called requesting refills for the following medications:        vitamin D (ERGOCALCIFEROL) 1.25 MG (40550 UT) CAPS capsule       losartan-hydroCHLOROthiazide (HYZAAR) 50-12.5 MG per tablet         Please send to [facility] on [address] in [place], [place]

## 2024-06-05 NOTE — TELEPHONE ENCOUNTER
----- Message from Becky Frost sent at 5/29/2024  4:10 PM EDT -----  Subject: Appointment Request    Reason for Call: Established Patient Appointment needed: Routine Existing   Condition Follow Up    QUESTIONS    Reason for appointment request? Other - ecc could not schedule      Additional Information for Provider? Patient is requesting to have a call   back to re-schedule his upcoming appointment scheduled on 5/30/2024 for   follow up for medication refill pt. states he is completely out of   medication and would like a appointment on a Friday if possible.  ---------------------------------------------------------------------------  --------------  CALL BACK INFO  [phone]; OK to leave message on voicemail  ---------------------------------------------------------------------------  --------------  SCRIPT ANSWERS   Start Hydralazine 10 mg tonight - take one tablet three times a day

## 2024-07-18 PROBLEM — E78.2 MIXED HYPERLIPIDEMIA: Status: ACTIVE | Noted: 2024-07-18

## 2024-07-19 ENCOUNTER — OFFICE VISIT (OUTPATIENT)
Facility: CLINIC | Age: 39
End: 2024-07-19
Payer: COMMERCIAL

## 2024-07-19 VITALS
OXYGEN SATURATION: 97 % | WEIGHT: 286 LBS | DIASTOLIC BLOOD PRESSURE: 80 MMHG | RESPIRATION RATE: 18 BRPM | HEIGHT: 72 IN | SYSTOLIC BLOOD PRESSURE: 134 MMHG | BODY MASS INDEX: 38.74 KG/M2 | TEMPERATURE: 98 F | HEART RATE: 84 BPM

## 2024-07-19 DIAGNOSIS — E78.2 MIXED HYPERLIPIDEMIA: ICD-10-CM

## 2024-07-19 DIAGNOSIS — E11.65 TYPE 2 DIABETES MELLITUS WITH HYPERGLYCEMIA, WITHOUT LONG-TERM CURRENT USE OF INSULIN (HCC): ICD-10-CM

## 2024-07-19 DIAGNOSIS — I10 PRIMARY HYPERTENSION: ICD-10-CM

## 2024-07-19 PROCEDURE — 3079F DIAST BP 80-89 MM HG: CPT | Performed by: INTERNAL MEDICINE

## 2024-07-19 PROCEDURE — 3075F SYST BP GE 130 - 139MM HG: CPT | Performed by: INTERNAL MEDICINE

## 2024-07-19 PROCEDURE — 99214 OFFICE O/P EST MOD 30 MIN: CPT | Performed by: INTERNAL MEDICINE

## 2024-07-19 RX ORDER — LOSARTAN POTASSIUM AND HYDROCHLOROTHIAZIDE 12.5; 5 MG/1; MG/1
1 TABLET ORAL EVERY MORNING
Qty: 90 TABLET | Refills: 1 | Status: SHIPPED | OUTPATIENT
Start: 2024-07-19

## 2024-07-19 RX ORDER — ACETAMINOPHEN 160 MG
TABLET,DISINTEGRATING ORAL
Qty: 30 CAPSULE | Refills: 5 | Status: SHIPPED | OUTPATIENT
Start: 2024-07-19

## 2024-07-19 ASSESSMENT — PATIENT HEALTH QUESTIONNAIRE - PHQ9
SUM OF ALL RESPONSES TO PHQ9 QUESTIONS 1 & 2: 0
SUM OF ALL RESPONSES TO PHQ QUESTIONS 1-9: 0
1. LITTLE INTEREST OR PLEASURE IN DOING THINGS: NOT AT ALL
SUM OF ALL RESPONSES TO PHQ QUESTIONS 1-9: 0
2. FEELING DOWN, DEPRESSED OR HOPELESS: NOT AT ALL
SUM OF ALL RESPONSES TO PHQ QUESTIONS 1-9: 0
SUM OF ALL RESPONSES TO PHQ QUESTIONS 1-9: 0

## 2024-07-19 ASSESSMENT — ENCOUNTER SYMPTOMS
SORE THROAT: 0
ABDOMINAL PAIN: 0
SHORTNESS OF BREATH: 0
EYES NEGATIVE: 1
CHEST TIGHTNESS: 0
CONSTIPATION: 0
DIARRHEA: 0
COUGH: 0

## 2024-07-19 NOTE — PROGRESS NOTES
Curtis Jacobo (: 1985) is a 38 y.o. male, Established patient patient, here for evaluation of the following chief complaint(s):  Follow-up Chronic Condition         ASSESSMENT/PLAN:  Below is the assessment and plan developed based on review of pertinent history, physical exam, labs, studies, and medications.      1. Primary hypertension  2. Type 2 diabetes mellitus with hyperglycemia, without long-term current use of insulin (HCC)  -     CBC with Auto Differential; Future  -     Comprehensive Metabolic Panel; Future  -     Hemoglobin A1C; Future  3. BMI 40.0-44.9, adult ([facility])  4. Mixed hyperlipidemia  -     Lipid Panel; Future    Hypertension, controlled, continue current dose of losartan hydrochlorothiazide 50/12.5 mg once a day morning.  Diet low in sodium.  Labs ordered.  Last lab was done in 2023.    Type 2 diabetes mellitus non-insulin-dependent controlled most recent hemoglobin A1c in 2023 improved to 6.4% from 7.2% in 2023.  He did not repeat the labs in between so labs ordered today.  Diet low in sugar, low in juice and stop drinking regular soda and juice and sweet tea and desserts and starch containing meals and excessively eating starch fast food and processed food.  He drinks liquor on weekend.  Recommended to stop it.    Exercise.    Dyslipidemia he had elevated LDL and cholesterol and low HDL he needs exercise.  He should stop drinking.  I ordered labs and he may need statin depending on the lab results I will decide.    He should take over-the-counter vitamin D3 2000 units/day.  Vitamin D level improved.    For his BMI 38.78 he was taking phentermine that he finished.  I will order labs depending on that I can start GLP-1 receptor agonist and explained to him and he agreed.    No depression.  His random blood sugar is 114 in the office.  Return in about 3 months (around 10/19/2024) for diabetes, hypertension, cholesterol follow up.

## 2024-07-19 NOTE — PROGRESS NOTES
Chief Complaint   Patient presents with    Follow-up Chronic Condition     BP (!) 137/92 (Site: Left Upper Arm, Position: Sitting)   Pulse 87   Temp 98 °F (36.7 °C) (Oral)   Resp 18   Ht 1.829 m (6' 0.01\")   Wt 129.7 kg (286 lb)   SpO2 97%   BMI 38.78 kg/m²     \"Have you been to the ER, urgent care clinic since your last visit?  Hospitalized since your last visit?\"    NO    “Have you seen or consulted any other health care providers outside of [facility] since your last visit?”    NO            Click Here for Release of Records Request

## 2024-07-26 LAB
ALBUMIN SERPL-MCNC: 4.1 G/DL (ref 4.1–5.1)
ALP SERPL-CCNC: 51 IU/L (ref 44–121)
ALT SERPL-CCNC: 12 IU/L (ref 0–44)
AST SERPL-CCNC: 13 IU/L (ref 0–40)
BASOPHILS # BLD AUTO: 0 X10E3/UL (ref 0–0.2)
BASOPHILS NFR BLD AUTO: 1 %
BILIRUB SERPL-MCNC: 0.2 MG/DL (ref 0–1.2)
BUN SERPL-MCNC: 12 MG/DL (ref 6–20)
BUN/CREAT SERPL: 14 (ref 9–20)
CALCIUM SERPL-MCNC: 9.2 MG/DL (ref 8.7–10.2)
CHLORIDE SERPL-SCNC: 102 MMOL/L (ref 96–106)
CHOLEST SERPL-MCNC: 209 MG/DL (ref 100–199)
CO2 SERPL-SCNC: 24 MMOL/L (ref 20–29)
CREAT SERPL-MCNC: 0.85 MG/DL (ref 0.76–1.27)
EGFRCR SERPLBLD CKD-EPI 2021: 114 ML/MIN/1.73
EOSINOPHIL # BLD AUTO: 0.1 X10E3/UL (ref 0–0.4)
EOSINOPHIL NFR BLD AUTO: 2 %
ERYTHROCYTE [DISTWIDTH] IN BLOOD BY AUTOMATED COUNT: 14.6 % (ref 11.6–15.4)
GLOBULIN SER CALC-MCNC: 3.1 G/DL (ref 1.5–4.5)
GLUCOSE SERPL-MCNC: 102 MG/DL (ref 70–99)
HBA1C MFR BLD: 6.5 % (ref 4.8–5.6)
HCT VFR BLD AUTO: 40.4 % (ref 37.5–51)
HDLC SERPL-MCNC: 43 MG/DL
HGB BLD-MCNC: 13.1 G/DL (ref 13–17.7)
IMM GRANULOCYTES # BLD AUTO: 0 X10E3/UL (ref 0–0.1)
IMM GRANULOCYTES NFR BLD AUTO: 0 %
LDLC SERPL CALC-MCNC: 151 MG/DL (ref 0–99)
LYMPHOCYTES # BLD AUTO: 1.7 X10E3/UL (ref 0.7–3.1)
LYMPHOCYTES NFR BLD AUTO: 40 %
MCH RBC QN AUTO: 26.6 PG (ref 26.6–33)
MCHC RBC AUTO-ENTMCNC: 32.4 G/DL (ref 31.5–35.7)
MCV RBC AUTO: 82 FL (ref 79–97)
MONOCYTES # BLD AUTO: 0.3 X10E3/UL (ref 0.1–0.9)
MONOCYTES NFR BLD AUTO: 7 %
NEUTROPHILS # BLD AUTO: 2.1 X10E3/UL (ref 1.4–7)
NEUTROPHILS NFR BLD AUTO: 50 %
PLATELET # BLD AUTO: 289 X10E3/UL (ref 150–450)
POTASSIUM SERPL-SCNC: 4.6 MMOL/L (ref 3.5–5.2)
PROT SERPL-MCNC: 7.2 G/DL (ref 6–8.5)
RBC # BLD AUTO: 4.93 X10E6/UL (ref 4.14–5.8)
SODIUM SERPL-SCNC: 140 MMOL/L (ref 134–144)
TRIGL SERPL-MCNC: 84 MG/DL (ref 0–149)
VLDLC SERPL CALC-MCNC: 15 MG/DL (ref 5–40)
WBC # BLD AUTO: 4.2 X10E3/UL (ref 3.4–10.8)

## 2024-07-26 RX ORDER — SEMAGLUTIDE 1.34 MG/ML
0.5 INJECTION, SOLUTION SUBCUTANEOUS
Qty: 3 ML | Refills: 1 | Status: SHIPPED | OUTPATIENT
Start: 2024-07-26

## 2024-07-26 RX ORDER — ATORVASTATIN CALCIUM 10 MG/1
10 TABLET, FILM COATED ORAL NIGHTLY
Qty: 90 TABLET | Refills: 1 | Status: SHIPPED | OUTPATIENT
Start: 2024-07-26

## 2024-08-27 DIAGNOSIS — E78.2 MIXED HYPERLIPIDEMIA: ICD-10-CM

## 2024-08-27 DIAGNOSIS — E11.65 TYPE 2 DIABETES MELLITUS WITH HYPERGLYCEMIA, WITHOUT LONG-TERM CURRENT USE OF INSULIN (HCC): ICD-10-CM

## 2024-08-28 RX ORDER — LOSARTAN POTASSIUM AND HYDROCHLOROTHIAZIDE 12.5; 5 MG/1; MG/1
1 TABLET ORAL EVERY MORNING
Qty: 90 TABLET | Refills: 1 | Status: SHIPPED | OUTPATIENT
Start: 2024-08-28

## 2024-08-28 RX ORDER — SEMAGLUTIDE 1.34 MG/ML
1 INJECTION, SOLUTION SUBCUTANEOUS
Qty: 9 ML | Refills: 1 | Status: SHIPPED | OUTPATIENT
Start: 2024-08-28 | End: 2025-02-24

## 2024-08-28 RX ORDER — SEMAGLUTIDE 1.34 MG/ML
0.5 INJECTION, SOLUTION SUBCUTANEOUS
Qty: 3 ML | Refills: 1 | OUTPATIENT
Start: 2024-08-28

## 2024-09-25 DIAGNOSIS — E78.2 MIXED HYPERLIPIDEMIA: ICD-10-CM

## 2024-09-25 DIAGNOSIS — E11.65 TYPE 2 DIABETES MELLITUS WITH HYPERGLYCEMIA, WITHOUT LONG-TERM CURRENT USE OF INSULIN (HCC): ICD-10-CM

## 2024-09-25 NOTE — TELEPHONE ENCOUNTER
Patient called and stated that he has hip pain that radiates to his buttocks. He did state that he was doing stretches and the pain started after doing stretches.        Call back   [phone]   03-Apr-2019

## 2024-10-11 RX ORDER — SEMAGLUTIDE 1.34 MG/ML
1 INJECTION, SOLUTION SUBCUTANEOUS
Qty: 9 ML | Refills: 1 | Status: SHIPPED | OUTPATIENT
Start: 2024-10-11 | End: 2025-04-09

## 2024-10-11 NOTE — TELEPHONE ENCOUNTER
Called the patient and he stated that he is no longer having hip pain. I did let him know that if he is having hip pain to be seen at urgent care. He does have an upcoming appointment in November. He stated that he needs a refill on his Ozempic he has completely ran out.

## 2024-11-04 PROBLEM — E11.65 TYPE 2 DIABETES MELLITUS WITH HYPERGLYCEMIA (HCC): Status: RESOLVED | Noted: 2023-09-26 | Resolved: 2024-11-04

## 2024-11-04 PROBLEM — E11.9 TYPE 2 DIABETES MELLITUS WITHOUT COMPLICATION, WITHOUT LONG-TERM CURRENT USE OF INSULIN (HCC): Status: ACTIVE | Noted: 2024-11-04

## 2024-11-18 ENCOUNTER — OFFICE VISIT (OUTPATIENT)
Facility: CLINIC | Age: 39
End: 2024-11-18
Payer: COMMERCIAL

## 2024-11-18 VITALS
HEART RATE: 64 BPM | BODY MASS INDEX: 38.19 KG/M2 | RESPIRATION RATE: 18 BRPM | DIASTOLIC BLOOD PRESSURE: 84 MMHG | WEIGHT: 282 LBS | SYSTOLIC BLOOD PRESSURE: 130 MMHG | TEMPERATURE: 98.2 F | HEIGHT: 72 IN | OXYGEN SATURATION: 98 %

## 2024-11-18 DIAGNOSIS — M54.50 CHRONIC BILATERAL LOW BACK PAIN WITHOUT SCIATICA: ICD-10-CM

## 2024-11-18 DIAGNOSIS — G89.29 CHRONIC BILATERAL LOW BACK PAIN WITHOUT SCIATICA: ICD-10-CM

## 2024-11-18 DIAGNOSIS — E11.9 TYPE 2 DIABETES MELLITUS WITHOUT COMPLICATION, WITHOUT LONG-TERM CURRENT USE OF INSULIN (HCC): ICD-10-CM

## 2024-11-18 DIAGNOSIS — R06.83 SNORING: ICD-10-CM

## 2024-11-18 DIAGNOSIS — E78.2 MIXED HYPERLIPIDEMIA: ICD-10-CM

## 2024-11-18 DIAGNOSIS — I10 PRIMARY HYPERTENSION: ICD-10-CM

## 2024-11-18 LAB
GLUCOSE, POC: 112 MG/DL
HBA1C MFR BLD: 5.9 %

## 2024-11-18 PROCEDURE — 82962 GLUCOSE BLOOD TEST: CPT | Performed by: INTERNAL MEDICINE

## 2024-11-18 PROCEDURE — 3044F HG A1C LEVEL LT 7.0%: CPT | Performed by: INTERNAL MEDICINE

## 2024-11-18 PROCEDURE — 3075F SYST BP GE 130 - 139MM HG: CPT | Performed by: INTERNAL MEDICINE

## 2024-11-18 PROCEDURE — 3079F DIAST BP 80-89 MM HG: CPT | Performed by: INTERNAL MEDICINE

## 2024-11-18 PROCEDURE — 99214 OFFICE O/P EST MOD 30 MIN: CPT | Performed by: INTERNAL MEDICINE

## 2024-11-18 PROCEDURE — 83036 HEMOGLOBIN GLYCOSYLATED A1C: CPT | Performed by: INTERNAL MEDICINE

## 2024-11-18 RX ORDER — LOSARTAN POTASSIUM AND HYDROCHLOROTHIAZIDE 12.5; 5 MG/1; MG/1
1 TABLET ORAL EVERY MORNING
Qty: 90 TABLET | Refills: 1 | Status: SHIPPED | OUTPATIENT
Start: 2024-11-18

## 2024-11-18 RX ORDER — SEMAGLUTIDE 2.68 MG/ML
2 INJECTION, SOLUTION SUBCUTANEOUS
Qty: 3 ML | Refills: 5 | Status: SHIPPED | OUTPATIENT
Start: 2024-11-18 | End: 2025-05-17

## 2024-11-18 RX ORDER — ATORVASTATIN CALCIUM 10 MG/1
10 TABLET, FILM COATED ORAL NIGHTLY
Qty: 90 TABLET | Refills: 1 | Status: SHIPPED | OUTPATIENT
Start: 2024-11-18

## 2024-11-18 SDOH — ECONOMIC STABILITY: INCOME INSECURITY: HOW HARD IS IT FOR YOU TO PAY FOR THE VERY BASICS LIKE FOOD, HOUSING, MEDICAL CARE, AND HEATING?: NOT HARD AT ALL

## 2024-11-18 SDOH — ECONOMIC STABILITY: FOOD INSECURITY: WITHIN THE PAST 12 MONTHS, THE FOOD YOU BOUGHT JUST DIDN'T LAST AND YOU DIDN'T HAVE MONEY TO GET MORE.: NEVER TRUE

## 2024-11-18 SDOH — ECONOMIC STABILITY: FOOD INSECURITY: WITHIN THE PAST 12 MONTHS, YOU WORRIED THAT YOUR FOOD WOULD RUN OUT BEFORE YOU GOT MONEY TO BUY MORE.: NEVER TRUE

## 2024-11-18 ASSESSMENT — ENCOUNTER SYMPTOMS
RESPIRATORY NEGATIVE: 1
GASTROINTESTINAL NEGATIVE: 1
ALLERGIC/IMMUNOLOGIC NEGATIVE: 1

## 2024-11-18 NOTE — PROGRESS NOTES
Curtis Jacobo (: 1985) is a 39 y.o. male, Established patient patient, here for evaluation of the following chief complaint(s):  Follow-up Chronic Condition         ASSESSMENT/PLAN:  Below is the assessment and plan developed based on review of pertinent history, physical exam, labs, studies, and medications.      1. Primary hypertension  -     semaglutide, 2 MG/DOSE, (OZEMPIC, 2 MG/DOSE,) 8 MG/3ML SOPN sc injection; Inject 2 mg into the skin every 7 days, Disp-3 mL, R-5Normal  -     Basic Metabolic Panel; Future  2. Type 2 diabetes mellitus without complication, without long-term current use of insulin (HCC)  -     AMB POC HEMOGLOBIN A1C  -     AMB POC GLUCOSE BLOOD, BY GLUCOSE MONITORING DEVICE  -     semaglutide, 2 MG/DOSE, (OZEMPIC, 2 MG/DOSE,) 8 MG/3ML SOPN sc injection; Inject 2 mg into the skin every 7 days, Disp-3 mL, R-5Normal  -     Amb External Referral To Ophthalmology  -     Hemoglobin A1C; Future  -     Microalbumin / Creatinine Urine Ratio; Future  3. Mixed hyperlipidemia  -     semaglutide, 2 MG/DOSE, (OZEMPIC, 2 MG/DOSE,) 8 MG/3ML SOPN sc injection; Inject 2 mg into the skin every 7 days, Disp-3 mL, R-5Normal  -     Hepatic Function Panel; Future  -     Lipid Panel; Future  4. BMI 38.0-38.9,adult  -     semaglutide, 2 MG/DOSE, (OZEMPIC, 2 MG/DOSE,) 8 MG/3ML SOPN sc injection; Inject 2 mg into the skin every 7 days, Disp-3 mL, R-5Normal  5. Chronic bilateral low back pain without sciatica  -     [facility] - Melissa Salvador MD, Orthopedic Surgery (sports medicine), [place]  6. Snoring  -     [facility]FL - Betito Riggs MD, Pulmonology, [place]      Hypertension well-controlled after resting manually checked by me continue current dose of losartan/HCTZ 50/12.5 mg once a day in morning.  Diet low in sodium.    Type 2 diabetes mellitus non-insulin-dependent controlled.    Today fasting blood sugar 112 and hemoglobin A1c improved to 5.9% from 7.2%.  Complemented and continue diet low in sugar

## 2024-11-18 NOTE — PROGRESS NOTES
Chief Complaint   Patient presents with    Follow-up Chronic Condition     BP (!) 150/110 (Site: Right Upper Arm, Position: Sitting)   Pulse 77   Temp 98.2 °F (36.8 °C) (Oral)   Resp 18   Ht 1.829 m (6' 0.01\")   Wt 127.9 kg (282 lb)   SpO2 98%   BMI 38.24 kg/m²     \"Have you been to the ER, urgent care clinic since your last visit?  Hospitalized since your last visit?\"    NO    “Have you seen or consulted any other health care providers outside our system since your last visit?”    NO      “Have you had a diabetic eye exam?”    NO     No diabetic eye exam on file

## 2024-11-21 LAB
ALBUMIN/CREAT UR: 3 MG/G CREAT (ref 0–29)
CREAT UR-MCNC: 259 MG/DL
MICROALBUMIN UR-MCNC: 8.7 UG/ML

## 2024-12-31 RX ORDER — UBIQUINOL 100 MG
CAPSULE ORAL
Qty: 100 EACH | Refills: 3 | Status: SHIPPED | OUTPATIENT
Start: 2024-12-31

## 2024-12-31 RX ORDER — ERGOCALCIFEROL 1.25 MG/1
50000 CAPSULE, LIQUID FILLED ORAL WEEKLY
Qty: 12 CAPSULE | Refills: 1 | OUTPATIENT
Start: 2024-12-31

## 2024-12-31 RX ORDER — PHENTERMINE HYDROCHLORIDE 37.5 MG/1
TABLET ORAL
Qty: 30 TABLET | OUTPATIENT
Start: 2024-12-31

## 2025-02-11 ENCOUNTER — TELEPHONE (OUTPATIENT)
Facility: CLINIC | Age: 40
End: 2025-02-11

## 2025-02-11 NOTE — TELEPHONE ENCOUNTER
All offices closing early due to inclement weather   Received \"after hours call\"-2:39 pm  Able to view chart 2:51 PM due to slow internet due to weather  Patient complaining of \"sore lump on neck\"    Denies SOB/difficulty swallowing/fever/chills    Does say he had a recent cold that wasn't bad  Says he also has a red bumpy rash on his back    I advised him if he develops SOB/difficulty swallowing/fever/chills or if the rash spreads beyond (outline in marker) to go to ED for eval for cellulitis    Would like for him to contact Dr Renee office tomorrow when clinics open back up from inclement weather

## 2025-02-14 ENCOUNTER — OFFICE VISIT (OUTPATIENT)
Facility: CLINIC | Age: 40
End: 2025-02-14
Payer: COMMERCIAL

## 2025-02-14 VITALS
HEIGHT: 72 IN | DIASTOLIC BLOOD PRESSURE: 95 MMHG | RESPIRATION RATE: 16 BRPM | BODY MASS INDEX: 39.01 KG/M2 | SYSTOLIC BLOOD PRESSURE: 144 MMHG | OXYGEN SATURATION: 98 % | HEART RATE: 68 BPM | WEIGHT: 288 LBS

## 2025-02-14 DIAGNOSIS — R22.1 NECK SWELLING: Primary | ICD-10-CM

## 2025-02-14 DIAGNOSIS — I10 PRIMARY HYPERTENSION: ICD-10-CM

## 2025-02-14 DIAGNOSIS — R22.1 NECK SWELLING: ICD-10-CM

## 2025-02-14 DIAGNOSIS — L03.221 CELLULITIS OF NECK: ICD-10-CM

## 2025-02-14 PROCEDURE — 3077F SYST BP >= 140 MM HG: CPT | Performed by: NURSE PRACTITIONER

## 2025-02-14 PROCEDURE — 99204 OFFICE O/P NEW MOD 45 MIN: CPT | Performed by: NURSE PRACTITIONER

## 2025-02-14 PROCEDURE — 3080F DIAST BP >= 90 MM HG: CPT | Performed by: NURSE PRACTITIONER

## 2025-02-14 RX ORDER — METHYLPREDNISOLONE 4 MG/1
TABLET ORAL
Qty: 1 KIT | Refills: 0 | Status: SHIPPED | OUTPATIENT
Start: 2025-02-14 | End: 2025-02-20

## 2025-02-14 RX ORDER — HYDROCORTISONE 25 MG/G
OINTMENT TOPICAL
Qty: 45 G | Refills: 1 | Status: SHIPPED | OUTPATIENT
Start: 2025-02-14 | End: 2025-02-21

## 2025-02-14 RX ORDER — MUPIROCIN 20 MG/G
OINTMENT TOPICAL
Qty: 30 G | Refills: 1 | Status: SHIPPED | OUTPATIENT
Start: 2025-02-14 | End: 2025-02-21

## 2025-02-14 SDOH — ECONOMIC STABILITY: FOOD INSECURITY: WITHIN THE PAST 12 MONTHS, YOU WORRIED THAT YOUR FOOD WOULD RUN OUT BEFORE YOU GOT MONEY TO BUY MORE.: NEVER TRUE

## 2025-02-14 SDOH — ECONOMIC STABILITY: FOOD INSECURITY: WITHIN THE PAST 12 MONTHS, THE FOOD YOU BOUGHT JUST DIDN'T LAST AND YOU DIDN'T HAVE MONEY TO GET MORE.: NEVER TRUE

## 2025-02-14 ASSESSMENT — ENCOUNTER SYMPTOMS
SHORTNESS OF BREATH: 0
CHEST TIGHTNESS: 0
SINUS PAIN: 0
EYE REDNESS: 0
NAUSEA: 0
CONSTIPATION: 0
TROUBLE SWALLOWING: 0
COUGH: 0
WHEEZING: 0
ABDOMINAL DISTENTION: 0
COLOR CHANGE: 0
ABDOMINAL PAIN: 0
VOMITING: 0

## 2025-02-14 ASSESSMENT — PATIENT HEALTH QUESTIONNAIRE - PHQ9
SUM OF ALL RESPONSES TO PHQ9 QUESTIONS 1 & 2: 0
1. LITTLE INTEREST OR PLEASURE IN DOING THINGS: NOT AT ALL
SUM OF ALL RESPONSES TO PHQ QUESTIONS 1-9: 0
2. FEELING DOWN, DEPRESSED OR HOPELESS: NOT AT ALL
SUM OF ALL RESPONSES TO PHQ QUESTIONS 1-9: 0

## 2025-02-14 NOTE — PROGRESS NOTES
Chief Complaint   Patient presents with    Rash     On back     knot left side of neck x 1 week           BP (!) 144/95 (Site: Left Upper Arm, Position: Sitting)   Pulse 68   Resp 16   Ht 1.829 m (6')   Wt 130.6 kg (288 lb)   SpO2 98%   BMI 39.06 kg/m²           \"Have you been to the ER, urgent care clinic since your last visit?  Hospitalized since your last visit?\"    NO    “Have you seen or consulted any other health care providers outside our system since your last visit?”    NO      “Have you had a diabetic eye exam?”    NO     No diabetic eye exam on file

## 2025-02-14 NOTE — PROGRESS NOTES
Subjective  Chief Complaint   Patient presents with    Rash     On back     knot left side of neck x 1 week     HPI:  Curtis Jacobo is a 39 y.o. male presents with past medical history including non-insulin-dependent type 2 diabetes, essential hypertension, mixed hyperlipidemia, class II obesity, chronic low back pain and allergic rhinitis.    Patient presents to clinic today with complaints of left cervical and subclavian swollen lymph node and new presentation of rash left posterior neck at the shoulder junction that is mildly painful with palpation.  Patient reports that his niece was recently diagnosed with strep throat. Patient denies complaints of fever, cough, sore throat, headaches and body aches, or loss of appetite. Unable to obtain point-of-care strep test in clinic, however upon examination noted absence of exudative white patches, however posterior oropharynx is positive for erythema.      Ordering CBC with differential and mononucleosis testing and consideration of left sided lymphatic swelling and new presentation of rash.    Posterior neck rash is erythematous papule rash with raised margins, tender with palpation and appears inflamed with subtle surrounding induration.  Will treat suspected cellulitis with topical steroid and antibiotic.    Also noted during physical examination patient's elevated blood pressure trends.  Discussed with patient home regimen, provided suggestions for increasing compliance and remembering medication.  Advised patient to continue home monitoring and to follow-up with PCP to review trends after increased medication consistency.       Review of Systems   Constitutional:  Positive for fatigue. Negative for appetite change and chills.   HENT:  Negative for congestion, nosebleeds, sinus pain and trouble swallowing.    Eyes:  Negative for redness and visual disturbance.   Respiratory:  Negative for cough, chest tightness, shortness of breath and wheezing.

## 2025-02-14 NOTE — ASSESSMENT & PLAN NOTE
Chronic, not at goal (unstable), continue current treatment plan, medication adherence emphasized, and lifestyle modifications recommended    Continue losartan-hydrochlorothiazide 50-12.5 mg daily  BP Readings from Last 3 Encounters:   02/14/25 (!) 144/95   11/18/24 130/84   07/19/24 134/80

## 2025-02-15 LAB
BASOPHILS # BLD AUTO: 0.1 X10E3/UL (ref 0–0.2)
BASOPHILS NFR BLD AUTO: 1 %
EOSINOPHIL # BLD AUTO: 0 X10E3/UL (ref 0–0.4)
EOSINOPHIL NFR BLD AUTO: 1 %
ERYTHROCYTE [DISTWIDTH] IN BLOOD BY AUTOMATED COUNT: 15 % (ref 11.6–15.4)
HCT VFR BLD AUTO: 41.3 % (ref 37.5–51)
HGB BLD-MCNC: 13.1 G/DL (ref 13–17.7)
IMM GRANULOCYTES # BLD AUTO: 0 X10E3/UL (ref 0–0.1)
IMM GRANULOCYTES NFR BLD AUTO: 0 %
LYMPHOCYTES # BLD AUTO: 1.3 X10E3/UL (ref 0.7–3.1)
LYMPHOCYTES NFR BLD AUTO: 35 %
MCH RBC QN AUTO: 26 PG (ref 26.6–33)
MCHC RBC AUTO-ENTMCNC: 31.7 G/DL (ref 31.5–35.7)
MCV RBC AUTO: 82 FL (ref 79–97)
MONOCYTES # BLD AUTO: 0.5 X10E3/UL (ref 0.1–0.9)
MONOCYTES NFR BLD AUTO: 13 %
NEUTROPHILS # BLD AUTO: 1.8 X10E3/UL (ref 1.4–7)
NEUTROPHILS NFR BLD AUTO: 50 %
PLATELET # BLD AUTO: 306 X10E3/UL (ref 150–450)
RBC # BLD AUTO: 5.03 X10E6/UL (ref 4.14–5.8)
WBC # BLD AUTO: 3.6 X10E3/UL (ref 3.4–10.8)

## 2025-02-18 LAB — HETEROPH AB SER QL LA: NEGATIVE

## 2025-02-27 RX ORDER — PHENTERMINE HYDROCHLORIDE 37.5 MG/1
TABLET ORAL
Qty: 30 TABLET | OUTPATIENT
Start: 2025-02-27

## 2025-03-16 PROBLEM — R53.83 FATIGUE, UNSPECIFIED TYPE: Status: RESOLVED | Noted: 2022-12-15 | Resolved: 2025-03-16

## 2025-03-16 PROBLEM — L03.221 CELLULITIS OF NECK: Status: RESOLVED | Noted: 2025-02-14 | Resolved: 2025-03-16

## 2025-03-16 PROBLEM — H54.3 IMPAIRED VISION IN BOTH EYES: Status: RESOLVED | Noted: 2023-09-26 | Resolved: 2025-03-16

## 2025-03-16 PROBLEM — E55.9 VITAMIN D DEFICIENCY: Status: RESOLVED | Noted: 2022-12-15 | Resolved: 2025-03-16

## 2025-03-20 ENCOUNTER — OFFICE VISIT (OUTPATIENT)
Facility: CLINIC | Age: 40
End: 2025-03-20
Payer: COMMERCIAL

## 2025-03-20 VITALS
WEIGHT: 287 LBS | HEIGHT: 72 IN | RESPIRATION RATE: 18 BRPM | OXYGEN SATURATION: 98 % | BODY MASS INDEX: 38.87 KG/M2 | HEART RATE: 92 BPM | TEMPERATURE: 97.6 F | SYSTOLIC BLOOD PRESSURE: 124 MMHG | DIASTOLIC BLOOD PRESSURE: 82 MMHG

## 2025-03-20 DIAGNOSIS — E78.2 MIXED HYPERLIPIDEMIA: ICD-10-CM

## 2025-03-20 DIAGNOSIS — E11.65 TYPE 2 DIABETES MELLITUS WITH HYPERGLYCEMIA, WITHOUT LONG-TERM CURRENT USE OF INSULIN (HCC): ICD-10-CM

## 2025-03-20 DIAGNOSIS — E11.9 TYPE 2 DIABETES MELLITUS WITHOUT COMPLICATION, WITHOUT LONG-TERM CURRENT USE OF INSULIN: ICD-10-CM

## 2025-03-20 DIAGNOSIS — I10 PRIMARY HYPERTENSION: ICD-10-CM

## 2025-03-20 PROCEDURE — 99214 OFFICE O/P EST MOD 30 MIN: CPT | Performed by: INTERNAL MEDICINE

## 2025-03-20 PROCEDURE — 3074F SYST BP LT 130 MM HG: CPT | Performed by: INTERNAL MEDICINE

## 2025-03-20 PROCEDURE — 3079F DIAST BP 80-89 MM HG: CPT | Performed by: INTERNAL MEDICINE

## 2025-03-20 RX ORDER — ATORVASTATIN CALCIUM 10 MG/1
10 TABLET, FILM COATED ORAL DAILY
Qty: 90 TABLET | Refills: 1 | Status: SHIPPED | OUTPATIENT
Start: 2025-03-20

## 2025-03-20 RX ORDER — LOSARTAN POTASSIUM AND HYDROCHLOROTHIAZIDE 12.5; 5 MG/1; MG/1
1 TABLET ORAL EVERY MORNING
Qty: 90 TABLET | Refills: 1 | Status: SHIPPED | OUTPATIENT
Start: 2025-03-20

## 2025-03-20 RX ORDER — SEMAGLUTIDE 2.68 MG/ML
2 INJECTION, SOLUTION SUBCUTANEOUS
Qty: 3 ML | Refills: 5 | Status: SHIPPED | OUTPATIENT
Start: 2025-03-20 | End: 2025-09-16

## 2025-03-20 ASSESSMENT — ENCOUNTER SYMPTOMS
RESPIRATORY NEGATIVE: 1
ALLERGIC/IMMUNOLOGIC NEGATIVE: 1
GASTROINTESTINAL NEGATIVE: 1

## 2025-03-20 NOTE — PROGRESS NOTES
Curtis Jacobo (: 1985) is a 39 y.o. male, Established patient patient, here for evaluation of the following chief complaint(s):  Follow-up Chronic Condition (Would like to talk about increasing appetite suppressant.)         ASSESSMENT/PLAN:  Below is the assessment and plan developed based on review of pertinent history, physical exam, labs, studies, and medications.      1. Type 2 diabetes mellitus without complication, without long-term current use of insulin ([facility])  -     [facility] External Referral To Ophthalmology  -     Basic Metabolic Panel; Future  -     Hemoglobin A1C; Future  -     semaglutide, 2 MG/DOSE, (OZEMPIC, 2 MG/DOSE,) 8 MG/3ML SOPN sc injection; Inject 2 mg into the skin every 7 days, Disp-3 mL, R-5Normal  -      DIABETES FOOT EXAM  2. Mixed hyperlipidemia  -     semaglutide, 2 MG/DOSE, (OZEMPIC, 2 MG/DOSE,) 8 MG/3ML SOPN sc injection; Inject 2 mg into the skin every 7 days, Disp-3 mL, R-5Normal  3. Primary hypertension  -     semaglutide, 2 MG/DOSE, (OZEMPIC, 2 MG/DOSE,) 8 MG/3ML SOPN sc injection; Inject 2 mg into the skin every 7 days, Disp-3 mL, R-5Normal  4. BMI 38.0-38.9,adult  -     semaglutide, 2 MG/DOSE, (OZEMPIC, 2 MG/DOSE,) 8 MG/3ML SOPN sc injection; Inject 2 mg into the skin every 7 days, Disp-3 mL, R-5Normal  5. Type 2 diabetes mellitus with hyperglycemia ([facility])  -     [facility]    Hypertension, controlled, continue current dose of losartan/HCTZ.  Diet low in sodium.  He should quit drinking on weekends.  Change the lifestyle with healthy eating habits with diet low in salt sugar and starch and he likes to eat Chinese food    Type 2 diabetes mellitus, controlled, non-insulin-dependent, last hemoglobin A1c was 5.9% in 2024 from 7.2% in 2023.  He has diabetic supply.  He does not check blood sugar at home.  Repeat labs ordered.  Continue current dose of Ozempic and he should walk minimum 30 minutes 5 days a week and he is

## 2025-03-20 NOTE — PROGRESS NOTES
Chief Complaint   Patient presents with    Follow-up Chronic Condition     Would like to talk about increasing appetite suppressant.       BP (!) 120/90 (BP Site: Right Upper Arm, Patient Position: Sitting)   Pulse 92   Temp 97.6 °F (36.4 °C) (Oral)   Resp 18   Ht 1.829 m (6' 0.01\")   Wt 130.2 kg (287 lb)   SpO2 98%   BMI 38.92 kg/m²       \"Have you been to the ER, urgent care clinic since your last visit?  Hospitalized since your last visit?\"    NO    “Have you seen or consulted any other health care providers outside our system since your last visit?”    NO    “Have you had a diabetic eye exam?”    NO     No diabetic eye exam on file

## 2025-03-21 ENCOUNTER — RESULTS FOLLOW-UP (OUTPATIENT)
Facility: CLINIC | Age: 40
End: 2025-03-21

## 2025-03-21 LAB
BUN SERPL-MCNC: 13 MG/DL (ref 6–20)
BUN/CREAT SERPL: 15 (ref 9–20)
CALCIUM SERPL-MCNC: 9.1 MG/DL (ref 8.7–10.2)
CHLORIDE SERPL-SCNC: 99 MMOL/L (ref 96–106)
CO2 SERPL-SCNC: 23 MMOL/L (ref 20–29)
CREAT SERPL-MCNC: 0.89 MG/DL (ref 0.76–1.27)
EGFRCR SERPLBLD CKD-EPI 2021: 112 ML/MIN/1.73
GLUCOSE SERPL-MCNC: 103 MG/DL (ref 70–99)
HBA1C MFR BLD: 6.4 % (ref 4.8–5.6)
POTASSIUM SERPL-SCNC: 4.6 MMOL/L (ref 3.5–5.2)
SODIUM SERPL-SCNC: 139 MMOL/L (ref 134–144)

## 2025-07-25 PROBLEM — R73.03 PREDIABETES: Status: ACTIVE | Noted: 2025-07-25

## 2025-07-30 ENCOUNTER — OFFICE VISIT (OUTPATIENT)
Facility: CLINIC | Age: 40
End: 2025-07-30
Payer: COMMERCIAL

## 2025-07-30 VITALS
BODY MASS INDEX: 37.76 KG/M2 | SYSTOLIC BLOOD PRESSURE: 130 MMHG | HEART RATE: 81 BPM | HEIGHT: 72 IN | DIASTOLIC BLOOD PRESSURE: 96 MMHG | OXYGEN SATURATION: 95 % | WEIGHT: 278.8 LBS | TEMPERATURE: 98 F

## 2025-07-30 DIAGNOSIS — H53.9 VISION DISTURBANCE: ICD-10-CM

## 2025-07-30 DIAGNOSIS — E78.2 MIXED HYPERLIPIDEMIA: ICD-10-CM

## 2025-07-30 DIAGNOSIS — E11.9 TYPE 2 DIABETES MELLITUS WITHOUT COMPLICATION, WITHOUT LONG-TERM CURRENT USE OF INSULIN (HCC): ICD-10-CM

## 2025-07-30 DIAGNOSIS — I10 PRIMARY HYPERTENSION: ICD-10-CM

## 2025-07-30 DIAGNOSIS — I10 PRIMARY HYPERTENSION: Primary | ICD-10-CM

## 2025-07-30 LAB — HBA1C MFR BLD: 5.9 %

## 2025-07-30 PROCEDURE — 99214 OFFICE O/P EST MOD 30 MIN: CPT | Performed by: INTERNAL MEDICINE

## 2025-07-30 PROCEDURE — 3044F HG A1C LEVEL LT 7.0%: CPT | Performed by: INTERNAL MEDICINE

## 2025-07-30 PROCEDURE — 3075F SYST BP GE 130 - 139MM HG: CPT | Performed by: INTERNAL MEDICINE

## 2025-07-30 PROCEDURE — 83036 HEMOGLOBIN GLYCOSYLATED A1C: CPT | Performed by: INTERNAL MEDICINE

## 2025-07-30 PROCEDURE — 3080F DIAST BP >= 90 MM HG: CPT | Performed by: INTERNAL MEDICINE

## 2025-07-30 RX ORDER — LOSARTAN POTASSIUM AND HYDROCHLOROTHIAZIDE 12.5; 1 MG/1; MG/1
1 TABLET ORAL DAILY
Qty: 90 TABLET | Refills: 2 | Status: SHIPPED | OUTPATIENT
Start: 2025-07-30

## 2025-07-30 ASSESSMENT — ENCOUNTER SYMPTOMS
RESPIRATORY NEGATIVE: 1
ALLERGIC/IMMUNOLOGIC NEGATIVE: 1
GASTROINTESTINAL NEGATIVE: 1

## 2025-07-30 NOTE — PROGRESS NOTES
Curtis Jacobo (: 1985) is a 40 y.o. male, Established patient patient, here for evaluation of the following chief complaint(s):  Follow-up Chronic Condition         ASSESSMENT/PLAN:  Below is the assessment and plan developed based on review of pertinent history, physical exam, labs, studies, and medications.      1. Primary hypertension  -     Amb External Referral To Ophthalmology  -     Comprehensive Metabolic Panel; Future  2. Type 2 diabetes mellitus without complication, without long-term current use of insulin (HCC)  -     AMB POC HEMOGLOBIN A1C  -     AMB POC GLUCOSE BLOOD, BY GLUCOSE MONITORING DEVICE  -     Comprehensive Metabolic Panel; Future  3. Mixed hyperlipidemia  -     Lipid Panel; Future  -     Comprehensive Metabolic Panel; Future  4. BMI 40.0-44.9, adult ([facility])  5. Vision disturbance  -     Amb External Referral To Ophthalmology    Hypertension, uncontrolled, losartan/HCTZ increased to 100/12.5 mg once a day.  Diet low in sodium.  Diet low in fast food and unhealthy food.    He says he has taken his medicine today morning.    Type 2 diabetes mellitus controlled non-insulin-dependent    In between he stopped Ozempic now he has started back and hemoglobin A1c improved to 5.9% and today blood sugar is 112 in the office.  He should stop diet rich in sugar starch and eat healthy diet with balanced diet and low calorie diet.  He should walk minimum 30 minutes 5 days a week or do indoor or outdoor exercise    Continue statin    Hypercholesteremia fasting lipid profile ordered continue atorvastatin.  Diet low in greasy oily fried food.    BMI 37.80.    Recommended to take vitamin D3 and calcium.    I have referred him to ophthalmologist and he says that he will check with his insurance before going to ophthalmologist.  Recommended to have healthy lifestyle.  Follow-up in 4 months.  He is aware that I have sent the losartan/HCTZ 100/12.5 mg once a day dose to the pharmacy.    No

## 2025-07-30 NOTE — PROGRESS NOTES
Chief Complaint   Patient presents with    Follow-up Chronic Condition       BP (!) 130/96 (BP Site: Left Upper Arm, Patient Position: Sitting)   Pulse 81   Temp 98 °F (36.7 °C) (Oral)   Ht 1.829 m (6' 0.01\")   Wt 126.5 kg (278 lb 12.8 oz)   SpO2 95%   BMI 37.80 kg/m²         \"Have you been to the ER, urgent care clinic since your last visit?  Hospitalized since your last visit?\"    NO    “Have you seen or consulted any other health care providers outside our system since your last visit?”    Yes Dr Amato    “Have you had a diabetic eye exam?”    NO     No diabetic eye exam on file

## 2025-07-31 LAB
ALBUMIN SERPL-MCNC: 4.4 G/DL (ref 4.1–5.1)
ALP SERPL-CCNC: 58 IU/L (ref 44–121)
ALT SERPL-CCNC: 13 IU/L (ref 0–44)
AST SERPL-CCNC: 13 IU/L (ref 0–40)
BILIRUB SERPL-MCNC: <0.2 MG/DL (ref 0–1.2)
BUN SERPL-MCNC: 13 MG/DL (ref 6–24)
BUN/CREAT SERPL: 15 (ref 9–20)
CALCIUM SERPL-MCNC: 9.3 MG/DL (ref 8.7–10.2)
CHLORIDE SERPL-SCNC: 101 MMOL/L (ref 96–106)
CHOLEST SERPL-MCNC: 169 MG/DL (ref 100–199)
CO2 SERPL-SCNC: 23 MMOL/L (ref 20–29)
CREAT SERPL-MCNC: 0.85 MG/DL (ref 0.76–1.27)
EGFRCR SERPLBLD CKD-EPI 2021: 113 ML/MIN/1.73
GLOBULIN SER CALC-MCNC: 3 G/DL (ref 1.5–4.5)
GLUCOSE SERPL-MCNC: 90 MG/DL (ref 70–99)
HDLC SERPL-MCNC: 41 MG/DL
LDLC SERPL CALC-MCNC: 100 MG/DL (ref 0–99)
POTASSIUM SERPL-SCNC: 4.4 MMOL/L (ref 3.5–5.2)
PROT SERPL-MCNC: 7.4 G/DL (ref 6–8.5)
SODIUM SERPL-SCNC: 139 MMOL/L (ref 134–144)
TRIGL SERPL-MCNC: 159 MG/DL (ref 0–149)
VLDLC SERPL CALC-MCNC: 28 MG/DL (ref 5–40)

## 2025-09-05 DIAGNOSIS — E78.2 MIXED HYPERLIPIDEMIA: ICD-10-CM

## 2025-09-05 DIAGNOSIS — I10 PRIMARY HYPERTENSION: ICD-10-CM

## 2025-09-05 DIAGNOSIS — E11.9 TYPE 2 DIABETES MELLITUS WITHOUT COMPLICATION, WITHOUT LONG-TERM CURRENT USE OF INSULIN (HCC): ICD-10-CM

## 2025-09-05 RX ORDER — SEMAGLUTIDE 2.68 MG/ML
2 INJECTION, SOLUTION SUBCUTANEOUS
Qty: 3 ML | Refills: 3 | Status: SHIPPED | OUTPATIENT
Start: 2025-09-05 | End: 2026-03-04